# Patient Record
Sex: MALE | Race: ASIAN | NOT HISPANIC OR LATINO | URBAN - METROPOLITAN AREA
[De-identification: names, ages, dates, MRNs, and addresses within clinical notes are randomized per-mention and may not be internally consistent; named-entity substitution may affect disease eponyms.]

---

## 2023-01-02 ENCOUNTER — EMERGENCY (EMERGENCY)
Facility: HOSPITAL | Age: 23
LOS: 1 days | Discharge: HOME | End: 2023-01-02
Admitting: EMERGENCY MEDICINE
Payer: COMMERCIAL

## 2023-01-02 VITALS
SYSTOLIC BLOOD PRESSURE: 117 MMHG | TEMPERATURE: 98 F | RESPIRATION RATE: 16 BRPM | OXYGEN SATURATION: 100 % | HEART RATE: 88 BPM | DIASTOLIC BLOOD PRESSURE: 54 MMHG

## 2023-01-02 VITALS
OXYGEN SATURATION: 99 % | HEART RATE: 64 BPM | RESPIRATION RATE: 16 BRPM | SYSTOLIC BLOOD PRESSURE: 103 MMHG | TEMPERATURE: 98 F | DIASTOLIC BLOOD PRESSURE: 58 MMHG

## 2023-01-02 PROCEDURE — 99284 EMERGENCY DEPT VISIT MOD MDM: CPT | Mod: 25

## 2023-01-02 PROCEDURE — 73130 X-RAY EXAM OF HAND: CPT | Mod: 26,RT

## 2023-01-02 PROCEDURE — 29130 APPL FINGER SPLINT STATIC: CPT

## 2023-01-02 RX ORDER — TETANUS TOXOID, REDUCED DIPHTHERIA TOXOID AND ACELLULAR PERTUSSIS VACCINE, ADSORBED 5; 2.5; 8; 8; 2.5 [IU]/.5ML; [IU]/.5ML; UG/.5ML; UG/.5ML; UG/.5ML
0.5 SUSPENSION INTRAMUSCULAR ONCE
Refills: 0 | Status: COMPLETED | OUTPATIENT
Start: 2023-01-02 | End: 2023-01-02

## 2023-01-02 RX ORDER — IBUPROFEN 200 MG
400 TABLET ORAL ONCE
Refills: 0 | Status: COMPLETED | OUTPATIENT
Start: 2023-01-02 | End: 2023-01-02

## 2023-01-02 RX ADMIN — Medication 400 MILLIGRAM(S): at 18:49

## 2023-01-02 RX ADMIN — TETANUS TOXOID, REDUCED DIPHTHERIA TOXOID AND ACELLULAR PERTUSSIS VACCINE, ADSORBED 0.5 MILLILITER(S): 5; 2.5; 8; 8; 2.5 SUSPENSION INTRAMUSCULAR at 18:49

## 2023-01-02 NOTE — ED PROVIDER NOTE - PATIENT PORTAL LINK FT
You can access the FollowMyHealth Patient Portal offered by [facility] by registering at the following website: [email or web address]. By joining [email or web address]’s FollowMyHealth portal, you will also be able to view your health information using other applications (apps) compatible with our system.

## 2023-01-02 NOTE — ED PROVIDER NOTE - PROGRESS NOTE DETAILS
BILLY Sanders: pt feels better ambulating without difficulty, finger splint applied for comfort.  Results reviewed with patient.  Discharge reviewed and discussed with patient.

## 2023-01-02 NOTE — ED PROVIDER NOTE - OBJECTIVE STATEMENT
22-year-old male with no significant past medical history presents to the emergency department complaining of pain and abrasion to right thumb status post trip and fall 2 days ago.  Patient denies head injury, weakness, numbness, tingling, chest pain, back pain, neck pain or any other injuries.  Tetanus status unknown.

## 2023-01-02 NOTE — ED PROVIDER NOTE - CLINICAL SUMMARY MEDICAL DECISION MAKING FREE TEXT BOX
22-year-old male with no significant past medical history presents to the emergency department complaining of pain and abrasion to right thumb status post trip and fall 2 days ago. Pt is well appearing, NAD, will obtain xray r/o fx, pain control, update tetanus, follow up hand. 22-year-old male with no significant past medical history presents to the emergency department complaining of pain and abrasion to right thumb status post trip and fall 2 days ago. Pt is well appearing, NAD, will obtain xray r/o fx, no sign of infection, pain control, update tetanus, follow up hand.

## 2023-01-02 NOTE — ED ADULT NURSE NOTE - NSIMPLEMENTINTERV_GEN_ALL_ED
Implemented All Fall Risk Interventions:  [place] to call system. Call bell, personal items and telephone within reach. Instruct patient to call for assistance. Room bathroom lighting operational. Non-slip footwear when patient is off stretcher. Physically safe environment: no spills, clutter or unnecessary equipment. Stretcher in lowest position, wheels locked, appropriate side rails in place. Provide visual cue, wrist band, yellow gown, etc. Monitor gait and stability. Monitor for mental status changes and reorient to person, place, and time. Review medications for side effects contributing to fall risk. Reinforce activity limits and safety measures with patient and family.

## 2023-01-02 NOTE — ED ADULT NURSE NOTE - OBJECTIVE STATEMENT
received pt in wellness lab room. 22 yr/o male presented to the ED C/o right thumb wound s/p slip and fall on saturday. small wounded, noted to right thumb, no bleeding noted. meds given as ordered. pt has no questions regarding discharge.

## 2023-01-02 NOTE — ED PROVIDER NOTE - NSFOLLOWUPINSTRUCTIONS_ED_ALL_ED_FT
Follow up with your Primary Medical Doctor in 1-2 days.  Follow up with Hand Specialist in 1-2 days see attached list.  Clean with soap and water.  Apply Bacitracin ointment to affected area 2 x a day x 5 days.  Return to the ER for any persistent/worsening or new symptoms fevers, chills, redness, swelling, weakness, numbness, tingling or any concerning symptoms.

## 2023-01-02 NOTE — ED PROVIDER NOTE - PHYSICAL EXAMINATION
Right hand: NV intact, +TTP over 1st DIP with superficial abrasion and avulsed skin dorsal aspect, no redness, no temp change, no discharge, mild swelling.

## 2024-10-03 ENCOUNTER — EMERGENCY (EMERGENCY)
Facility: HOSPITAL | Age: 24
LOS: 1 days | Discharge: HOME | End: 2024-10-03
Admitting: STUDENT IN AN ORGANIZED HEALTH CARE EDUCATION/TRAINING PROGRAM
Payer: COMMERCIAL

## 2024-10-03 VITALS
RESPIRATION RATE: 17 BRPM | SYSTOLIC BLOOD PRESSURE: 127 MMHG | DIASTOLIC BLOOD PRESSURE: 84 MMHG | OXYGEN SATURATION: 97 % | TEMPERATURE: 98 F | HEART RATE: 71 BPM

## 2024-10-03 VITALS
SYSTOLIC BLOOD PRESSURE: 127 MMHG | HEART RATE: 77 BPM | OXYGEN SATURATION: 97 % | TEMPERATURE: 98 F | WEIGHT: 175.05 LBS | RESPIRATION RATE: 18 BRPM | DIASTOLIC BLOOD PRESSURE: 80 MMHG

## 2024-10-03 PROCEDURE — 73130 X-RAY EXAM OF HAND: CPT | Mod: 26,LT

## 2024-10-03 PROCEDURE — 99284 EMERGENCY DEPT VISIT MOD MDM: CPT

## 2024-10-03 RX ORDER — TETANUS TOXOID, REDUCED DIPHTHERIA TOXOID AND ACELLULAR PERTUSSIS VACCINE, ADSORBED 5; 2.5; 8; 8; 2.5 [IU]/.5ML; [IU]/.5ML; UG/.5ML; UG/.5ML; UG/.5ML
0.5 SUSPENSION INTRAMUSCULAR ONCE
Refills: 0 | Status: DISCONTINUED | OUTPATIENT
Start: 2024-10-03 | End: 2024-10-03

## 2024-10-03 RX ADMIN — Medication 1 TABLET(S): at 20:24

## 2024-10-03 NOTE — ED PROVIDER NOTE - PATIENT PORTAL LINK FT
You can access the FollowMyHealth Patient Portal offered by [facility] by registering at the following website: [email or web address]. By joining [employer]’s FollowMyHealth portal, you will also be able to view your health information using other applications (apps) compatible with our system.

## 2024-10-03 NOTE — ED PROVIDER NOTE - OBJECTIVE STATEMENT
23-year-old male with no reported past medical history presenting with cat scratch to the left hand that he sustained yesterday.  Patient states he has some stray cats in his yard and when he got too close to the kittens the mother cat used her palm scratched his hand hitting the knuckle of the index finger.  Patient is sure there was no biting of at this encounter. Patient states there has been no pain redness or discharge since injury.  Due to history animal he wanted to be evaluated medically therefore came to the emergency room. Pt has tetanus shot last year    Denies fevers, chills, paresthesias, weakness, redness, swelling, discharge, numbness or tingling, red or hot joint, inability to move extremity, change or color or temperature to extremity, rash, nausea or vomiting.

## 2024-10-03 NOTE — ED ADULT NURSE NOTE - NSFALLUNIVINTERV_ED_ALL_ED
Bed/Stretcher in lowest position, wheels locked, appropriate side rails in place/Call bell, personal items and telephone in reach/Instruct patient to call for assistance before getting out of bed/chair/stretcher/Non-slip footwear applied when patient is off stretcher/[place] to call system/Physically safe environment - no spills, clutter or unnecessary equipment/Purposeful proactive rounding/Room/bathroom lighting operational, light cord in reach

## 2024-10-03 NOTE — ED PROVIDER NOTE - SKIN WOUND TYPE
There is a superficial abrasion to the second MTP with no surrounding erythema, streaking, discharge, there is no notable open wound or scabbing.  Skin appears intact no bruising or deformity.  Sensation intact, range of motion of the finger and an upper extremity, cap refill less than.  Radial pulse 2+/ABRASION(S)

## 2024-10-03 NOTE — ED PROVIDER NOTE - CLINICAL SUMMARY MEDICAL DECISION MAKING FREE TEXT BOX
23-year-old male with no reported past medical history presenting with cat scratch to the left hand that he sustained yesterday.  Patient states he has some stray cats in his yard and when he got too close to the kittens the mother cat used her palm scratched his hand hitting the knuckle of the index finger.  Patient is sure there was no biting of at this encounter. Patient states there has been no pain redness or discharge since injury.  Due to history animal he wanted to be evaluated medically therefore came to the emergency room.    The patient suffered a scratch wound from a cat but no bite, but based on the history, exam, and any test performed, there does not seem to be a retained foreign body, nerve injury, vascular injury, tendon injury, or bone injury.   As no encounter with bite/salvia rabies vaccine no indicated. Will clean area of superficial abrasion and give 1st dose of Augmentin. X-ray performed to r/o fb.     Rx: Augmentin 875mg PO BID x 7days  Disposition: Patient will be discharged with strict return precautions and advice to follow up with primary MD within 24 hours for further evaluation.

## 2024-10-03 NOTE — ED ADULT NURSE NOTE - OBJECTIVE STATEMENT
pt c/o cat scratch to left index finger by stray cat yesterday. no redness/swelling noted. pt denies fevers. pt denies pain. vs as noted. meds given as ordered. awaiting xray

## 2024-10-03 NOTE — ED PROVIDER NOTE - NSFOLLOWUPINSTRUCTIONS_ED_ALL_ED_FT
Cat-Scratch Disease, Adult  Cat-scratch disease is a bacterial infection that is spread to humans through a bite or scratch from an infected cat. The infection can also be spread by having contact with an infected cat and then touching your eyes or mouth. It is sometimes called cat-scratch fever. The infection does not spread from person to person (is not contagious).    What are the causes?  This condition is caused by bacteria called Bartonella henselae. These bacteria are carried by fleas. Cats get infected from flea bites or flea droppings. The bacteria may be present in the mouth or on the claws of cats or kittens, especially those that are younger than 1 year old. Cats do not look or act sick when they have this infection.    What increases the risk?  You are more likely to develop this condition if:  You own or interact with a cat.  You have a weakened body defense system (immune system). Your immune system may be weakened if:  You are pregnant.  You have certain conditions like cancer, HIV, or AIDS.  You received a donated organ (transplant).  What are the signs or symptoms?  A person with a swollen lymph node in the armpit and a rash on the hand caused by cat-scratch disease.  Common symptoms of this condition include:  A bite or scratch that does not heal.  A red bump near the wound. The bump may be red, warm, and tender to the touch.  Other symptoms may take a few weeks to develop. They may include:  Swollen, tender glands (lymph nodes) in the neck or under the arms.  Fever.  Rash.  Joint pain.  Headache.  Low energy(listlessness).  Loss of appetite.  How is this diagnosed?  This condition is diagnosed based on your symptoms and your history of a scratch or bite from a cat. Your health care provider will examine your skin and look for swollen lymph nodes. You may have tests, such as:  A culture test. This involves testing a sample of fluid or pus from your wound.  Blood tests.  A lymph node biopsy. This involves removing and testing a tissue sample from a swollen lymph node.  How is this treated?  Cat-scratch disease usually goes away without treatment in 2–4 months. However, a more severe infection may require antibiotic medicine if:  You have other illnesses or problems that weaken the immune system.  Your symptoms cause discomfort.  If you have a painful, swollen lymph node, it may need to be drained with a needle. This is rare.    Follow these instructions at home:  Medicines    Take over-the-counter and prescription medicines only as told by your health care provider.  If you were prescribed an antibiotic medicine, take it as told by your health care provider. Do not stop taking the antibiotic even if you start to feel better.  General instructions    Return to your normal activities as told by your health care provider. Ask your health care provider what activities are safe for you.  Apply warm compresses to the area as told your health care provider.  Check your wound or swollen lymph node areas every day for signs of infection. Check for:  Redness, swelling, or pain.  Fluid or blood.  Warmth.  Pus or a bad smell.  Keep all follow-up visits. This is important.  How is this prevented?  Avoid playing roughly or taking food away from a cat.  Wash your hands well after you have contact with a cat.  If you get scratched or bitten, wash the injured area as soon as possible with warm water and soap.  Do not let a cat lick your skin if you have any cuts, sores, or scratches.  Do not  or play with stray cats.  If you have an indoor cat:  Do not let your cat outside. Having contact with stray cats may make your cat more likely to have contact with the bacteria.  Trim your cat's nails.  Check your cat for fleas. Ask your  which flea and tick repellent is safe to use for you and your cat.  Contact a health care provider if:  You have signs of infection in your wound or lymph nodes, such as:  Redness, swelling, or pain.  Fluid or blood.  Warmth.  Pus or bad smell.  You have a fever.  Your symptoms get worse or do not get better.  You have pain in your bones.  Get help right away if:  You develop pain in your abdomen.  Your skin rash spreads.  You feel dizzy or you faint.  You develop inflammation of your eye or vision problems.  You develop a stiff neck.  These symptoms may represent a serious problem that is an emergency. Do not wait to see if the symptoms will go away. Get medical help right away. Call your local emergency services (911 in the [place] Do not drive yourself to the hospital.    Summary  Cat-scratch disease, sometimes called cat-scratch fever, is a bacterial infection that is caused by a bite or scratch from an infected cat.  The infection can cause a red bump near the wound, swollen lymph nodes, and flu-like symptoms.  Bacteria that cause this condition may be present in the mouth or on the claws of cats or kittens, especially those that are younger than 1 year old.  If you were prescribed an antibiotic medicine, take it as told by your health care provider. Do not stop taking the antibiotic even if you start to feel better.  This information is not intended to replace advice given to you by your health care provider. Make sure you discuss any questions you have with your health care provider.

## 2024-10-04 PROBLEM — Z78.9 OTHER SPECIFIED HEALTH STATUS: Chronic | Status: ACTIVE | Noted: 2023-01-02

## 2024-11-13 ENCOUNTER — EMERGENCY (EMERGENCY)
Facility: HOSPITAL | Age: 24
LOS: 1 days | Discharge: HOME | End: 2024-11-13
Attending: EMERGENCY MEDICINE | Admitting: EMERGENCY MEDICINE
Payer: COMMERCIAL

## 2024-11-13 VITALS
RESPIRATION RATE: 15 BRPM | OXYGEN SATURATION: 98 % | TEMPERATURE: 98 F | WEIGHT: 171.96 LBS | HEIGHT: 67 IN | DIASTOLIC BLOOD PRESSURE: 84 MMHG | SYSTOLIC BLOOD PRESSURE: 129 MMHG | HEART RATE: 77 BPM

## 2024-11-13 PROCEDURE — 99284 EMERGENCY DEPT VISIT MOD MDM: CPT

## 2024-11-13 RX ORDER — KETOROLAC TROMETHAMINE 30 MG/ML
30 INJECTION INTRAMUSCULAR; INTRAVENOUS ONCE
Refills: 0 | Status: DISCONTINUED | OUTPATIENT
Start: 2024-11-13 | End: 2024-11-13

## 2024-11-13 RX ORDER — METOCLOPRAMIDE HCL 10 MG
10 TABLET ORAL ONCE
Refills: 0 | Status: COMPLETED | OUTPATIENT
Start: 2024-11-13 | End: 2024-11-13

## 2024-11-13 RX ORDER — ACETAMINOPHEN 500 MG
1000 TABLET ORAL ONCE
Refills: 0 | Status: COMPLETED | OUTPATIENT
Start: 2024-11-13 | End: 2024-11-13

## 2024-11-13 RX ADMIN — Medication 10 MILLIGRAM(S): at 05:14

## 2024-11-13 RX ADMIN — Medication 400 MILLIGRAM(S): at 05:14

## 2024-11-13 RX ADMIN — KETOROLAC TROMETHAMINE 30 MILLIGRAM(S): 30 INJECTION INTRAMUSCULAR; INTRAVENOUS at 05:14

## 2024-11-13 RX ADMIN — Medication 1000 MILLILITER(S): at 05:13

## 2024-11-13 NOTE — ED PROVIDER NOTE - PATIENT PORTAL LINK FT
You can access the FollowMyHealth Patient Portal offered by [facility] by registering at the following website: [email or web address]. By joining [employer]’s FollowMyHealth portal, you will also be able to view your health information using other applications (apps) compatible with our system.

## 2024-11-13 NOTE — ED PROVIDER NOTE - NS ED ROS FT
REVIEW OF SYSTEMS:  CONSTITUTIONAL: No weakness, fevers or chills  EYES/ENT: No visual changes;  No vertigo or throat pain   NECK: No pain or stiffness  RESPIRATORY: No cough, wheezing, hemoptysis; No shortness of breath  CARDIOVASCULAR: No chest pain or palpitations  GASTROINTESTINAL: No abdominal or epigastric pain. No nausea, vomiting, or hematemesis; No diarrhea or constipation. No melena or hematochezia.  GENITOURINARY: No dysuria, frequency or hematuria  NEUROLOGICAL: No numbness or weakness, + HA   SKIN: No itching, rashes

## 2024-11-13 NOTE — ED PROVIDER NOTE - CLINICAL SUMMARY MEDICAL DECISION MAKING FREE TEXT BOX
23M with hx of sinusitis, headaches, presenting with acute onset headaches x1d with nasal discharge for 3 days. Pt reports feeling general malaise for the past week with yellow/green nasal discharge w/o fevers, chills, coughs or sore throat. This PM, patient had progressive 8/10 unilateral (R) headache with associated tearing. Pt took edecrin and acteaminophen today with minimal relief. Pt reports photophobia but denies neck stiffness. No recent travel or sick contacts. Patient denies chest pain, shortness of breath, sputum production, abdominal pain, diarrhea, constipation.   Vitals WNL Ddx - sinusitis vs migraine headache 23M with hx of sinusitis, headaches, presenting with acute onset headaches x1d with nasal discharge for 3 days. Pt reports feeling general malaise for the past week with yellow/green nasal discharge w/o fevers, chills, coughs or sore throat. This PM, patient had progressive 8/10 unilateral (R) headache with associated tearing. Pt took edecrin and acteaminophen today with minimal relief. Pt reports photophobia but denies neck stiffness. No recent travel or sick contacts. Patient denies chest pain, shortness of breath, sputum production, abdominal pain, diarrhea, constipation.   Vitals WNL, exam with some tenderness in maxillary sinus, Ddx - sinusitis vs migraine headache  Plan - IVF, Toradol, IV Tylenol, Reglan   Hold abx given only 3d of sinus discharge w/o purulent drainge   Re-assess

## 2024-11-13 NOTE — ED PROVIDER NOTE - ATTENDING CONTRIBUTION TO CARE
23-year-old male with a history of sinusitis, headaches that presents with acute onset headache x 1 day with nasal discharge for the last 3 days.  Patient states that this happens every year during wintertime and usually when he goes up to [place] [place] where he went to college.  He returned from [place] this morning started having symptoms.  He has been taking Tylenol and ibuprofen over-the-counter with minimal relief.  States that the right side of his head is pressure-like but no vision or hearing changes minimal photophobia on the right but not left does not wear glasses.  No recent fevers denies any sore throat is tolerating p.o. normally no nausea vomiting or diarrhea no chest pain or shortness of breath no cough no known sick contacts no smoking or drinking or drugs.  No foreign travel.  Patient has never seen an ENT for this but has been told multiple times to do so    Well-appearing no acute distress head is atraumatic cranial nerves II through XII are intact no injections in bilateral eyes.  Nasopharynx appears normal oropharynx appears normal uvula is midline tongue is flat no cervical lymphadenopathy.  Lungs are clear bilaterally heart is regular rate and rhythm abdomen soft nontender pulses are palpable x 4.  Skin is dry no rash no signs of trauma.  Cranial nerves II through XII are intact no facial droop no focal deficits    23-year-old male with history of sinusitis and headaches that presents with headache.  Headache is typical of his usual yearly headaches which he states that he gets usually during the winter and usually after returning from [place] where he went to college.  Return from [place] today and has a typical headache.  Patient has no signs or symptoms of CVA and has no risk factors otherwise.  He states that he has been trying over-the-counter medications at home but not really improving.  He states that usually he takes antibiotics which helps improve his symptoms however he has only been having symptoms for 1 day.  I explained to patient that we will provide medications and fluids but will not require any imaging at this time given his usual characteristics of his headache.  No risk factors for CVA.  Will reassess after treatment.

## 2024-11-13 NOTE — ED ADULT TRIAGE NOTE - CHIEF COMPLAINT QUOTE
alert oriented c/o sinus headache happens every winter  c/o subjective fever  has been congested x 4 days no hx

## 2024-11-13 NOTE — ED ADULT NURSE NOTE - OBJECTIVE STATEMENT
Patient received in 25, A&Ox3 ambulatory at baseline pmh: Patient received in 25, A&Ox3 ambulatory at baseline presenting to ED c/o sinus congestion x4 days and headache. Denies sick contacts, recent travel, CP, SOB, n/v/d, fever chills, abdominal pain, visual changes, urinary symptoms. Right 20g placed, meds given as per orders.

## 2024-11-13 NOTE — ED PROVIDER NOTE - OBJECTIVE STATEMENT
23M with hx of sinusitis, headaches, presenting with acute onset headaches x1d with nasal discharge for 3 days. Pt reports feeling general malaise for the past week with yellow/green nasal discharge w/o fevers, chills, coughs or sore throat. This PM, patient had progressive 8/10 unilateral (R) headache with associated tearing. Pt took edecrin and acteaminophen today with minimal relief. Pt reports photophobia but denies neck stiffness. No recent travel or sick contacts. Patient denies chest pain, shortness of breath, sputum production, abdominal pain, diarrhea, constipation.   Shx - social alcohol use, smokes tobacco occasionally   Medicine : Lisdexamfetamine for ADHD  No shx, NKDA

## 2024-11-13 NOTE — ED PROVIDER NOTE - NSFOLLOWUPINSTRUCTIONS_ED_ALL_ED_FT
You were seen in the emergency department [facility] for headache    We provided you with medications and fluids which you states improved your headache    Upon discharge you should follow-up with your primary care doctor and we will have someone from our hospital call you to set you up an appointment with the ENT doctor otherwise known as the ear nose and throat doctor otherwise known as an otolaryngologist.       You should be evaluated to see if you have any sinus issues such as deviated septum or small sinus tracts that do not allow you to drain.    If your symptoms worsen your headache worsens you start having vision or hearing changes you start having any nausea or vomiting or fevers with your headache please come back to the emergency department at that time for further treatment and evaluation.      Otherwise you can take over-the-counter medications such as Tylenol or ibuprofen but also recommend you drink plenty of fluids.      If you have sinus congestion you can try Afrin which she can get over-the-counter at any pharmacy to try to clear your sinuses you can also take hot showers and try to breathe in steam to help clear up your sinuses as well.

## 2024-11-13 NOTE — ED PROVIDER NOTE - PHYSICAL EXAMINATION
GENERAL: NAD, lying in bed comfortably  HEAD: Atraumatic, normocephalic, Tenderness to maxillary sinus R>L   EYES: EOMI, PERRLA, dilated bilaterally , conjunctiva and sclera clear  ENT: Moist mucous membranes  NECK: Supple, no LAD   HEART: S1, S2, Regular rate and rhythm, no murmurs, rubs, or gallops  LUNGS: Unlabored respirations, clear to auscultation bilaterally, no crackles, wheezing, or rhonchi  ABDOMEN: Soft, nontender, nondistended, +BS, no CVA tenderness   EXTREMITIES: 2+ peripheral pulses bilaterally. No clubbing, cyanosis, or edema  NERVOUS SYSTEM:  A&Ox3, no focal deficits   SKIN: No rashes or lesions

## 2024-11-13 NOTE — ED ADULT NURSE NOTE - NSFALLUNIVINTERV_ED_ALL_ED
Bed/Stretcher in lowest position, wheels locked, appropriate side rails in place/Call bell, personal items and telephone in reach/Instruct patient to call for assistance before getting out of bed/chair/stretcher/Non-slip footwear applied when patient is off stretcher/[place] to call system/Physically safe environment - no spills, clutter or unnecessary equipment/Purposeful proactive rounding/Room/bathroom lighting operational, light cord in reach

## 2024-11-13 NOTE — ED PROVIDER NOTE - PROGRESS NOTE DETAILS
KIAH: Patient was reassessed at this time and states that his symptoms have improved with medications and fluids.  Will discharge home.  Explained to patient no antibiotics at this time but will have ENT follow-up outpatient.  Will have discharge planning help with setting him up with appointment